# Patient Record
Sex: FEMALE | Race: WHITE
[De-identification: names, ages, dates, MRNs, and addresses within clinical notes are randomized per-mention and may not be internally consistent; named-entity substitution may affect disease eponyms.]

---

## 2017-04-27 ENCOUNTER — APPOINTMENT (OUTPATIENT)
Dept: OBGYN | Facility: CLINIC | Age: 22
End: 2017-04-27

## 2017-04-27 VITALS
BODY MASS INDEX: 27.28 KG/M2 | HEIGHT: 68 IN | WEIGHT: 180 LBS | SYSTOLIC BLOOD PRESSURE: 137 MMHG | DIASTOLIC BLOOD PRESSURE: 85 MMHG

## 2017-04-27 DIAGNOSIS — F17.200 NICOTINE DEPENDENCE, UNSPECIFIED, UNCOMPLICATED: ICD-10-CM

## 2017-05-02 LAB
C TRACH RRNA SPEC QL NAA+PROBE: NORMAL
CYTOLOGY CVX/VAG DOC THIN PREP: NORMAL
N GONORRHOEA RRNA SPEC QL NAA+PROBE: NORMAL
SOURCE TP AMPLIFICATION: NORMAL

## 2017-08-30 ENCOUNTER — RX RENEWAL (OUTPATIENT)
Age: 22
End: 2017-08-30

## 2017-10-28 ENCOUNTER — RX RENEWAL (OUTPATIENT)
Age: 22
End: 2017-10-28

## 2017-10-30 ENCOUNTER — RX RENEWAL (OUTPATIENT)
Age: 22
End: 2017-10-30

## 2018-05-07 ENCOUNTER — RX RENEWAL (OUTPATIENT)
Age: 23
End: 2018-05-07

## 2018-05-08 ENCOUNTER — RX RENEWAL (OUTPATIENT)
Age: 23
End: 2018-05-08

## 2018-05-24 ENCOUNTER — APPOINTMENT (OUTPATIENT)
Dept: OBGYN | Facility: CLINIC | Age: 23
End: 2018-05-24
Payer: COMMERCIAL

## 2018-05-24 VITALS
BODY MASS INDEX: 31.98 KG/M2 | SYSTOLIC BLOOD PRESSURE: 142 MMHG | WEIGHT: 211 LBS | DIASTOLIC BLOOD PRESSURE: 85 MMHG | HEIGHT: 68 IN

## 2018-05-24 VITALS — DIASTOLIC BLOOD PRESSURE: 75 MMHG | SYSTOLIC BLOOD PRESSURE: 113 MMHG

## 2018-05-24 PROCEDURE — 99395 PREV VISIT EST AGE 18-39: CPT

## 2018-05-28 LAB
C TRACH RRNA SPEC QL NAA+PROBE: NOT DETECTED
N GONORRHOEA RRNA SPEC QL NAA+PROBE: NOT DETECTED
SOURCE TP AMPLIFICATION: NORMAL

## 2018-05-31 LAB — CYTOLOGY CVX/VAG DOC THIN PREP: NORMAL

## 2018-07-09 ENCOUNTER — RX RENEWAL (OUTPATIENT)
Age: 23
End: 2018-07-09

## 2018-07-10 ENCOUNTER — RX RENEWAL (OUTPATIENT)
Age: 23
End: 2018-07-10

## 2018-07-25 ENCOUNTER — CLINICAL ADVICE (OUTPATIENT)
Age: 23
End: 2018-07-25

## 2019-07-15 ENCOUNTER — RX RENEWAL (OUTPATIENT)
Age: 24
End: 2019-07-15

## 2019-07-17 ENCOUNTER — MEDICATION RENEWAL (OUTPATIENT)
Age: 24
End: 2019-07-17

## 2019-09-03 ENCOUNTER — MEDICATION RENEWAL (OUTPATIENT)
Age: 24
End: 2019-09-03

## 2019-10-23 ENCOUNTER — APPOINTMENT (OUTPATIENT)
Dept: OBGYN | Facility: CLINIC | Age: 24
End: 2019-10-23
Payer: COMMERCIAL

## 2019-10-23 VITALS
DIASTOLIC BLOOD PRESSURE: 74 MMHG | SYSTOLIC BLOOD PRESSURE: 132 MMHG | BODY MASS INDEX: 28.49 KG/M2 | WEIGHT: 188 LBS | HEIGHT: 68 IN

## 2019-10-23 PROCEDURE — 99395 PREV VISIT EST AGE 18-39: CPT

## 2019-10-23 NOTE — HISTORY OF PRESENT ILLNESS
[1 Year Ago] : 1 year ago [Good] : being in good health [Healthy Diet] : a healthy diet [Regular Exercise] : regular exercise [Weight Concerns] : no concerns with her weight [Pap Smear Last Year] : Papanicolaou cytology last year [Menstrual Problems] : reports normal menses [Sexually Active] : is sexually active [Up to Date] : up to date with ~his/her~ STD screening

## 2019-10-29 LAB — CYTOLOGY CVX/VAG DOC THIN PREP: NORMAL

## 2019-11-30 DIAGNOSIS — A60.00 HERPESVIRAL INFECTION OF UROGENITAL SYSTEM, UNSPECIFIED: ICD-10-CM

## 2019-11-30 RX ORDER — VALACYCLOVIR 500 MG/1
500 TABLET, FILM COATED ORAL DAILY
Qty: 10 | Refills: 0 | Status: ACTIVE | COMMUNITY
Start: 2019-11-30 | End: 1900-01-01

## 2021-01-18 ENCOUNTER — RX RENEWAL (OUTPATIENT)
Age: 26
End: 2021-01-18

## 2021-05-06 ENCOUNTER — APPOINTMENT (OUTPATIENT)
Dept: OBGYN | Facility: CLINIC | Age: 26
End: 2021-05-06

## 2021-05-13 ENCOUNTER — APPOINTMENT (OUTPATIENT)
Dept: OBGYN | Facility: CLINIC | Age: 26
End: 2021-05-13
Payer: COMMERCIAL

## 2021-05-13 VITALS
DIASTOLIC BLOOD PRESSURE: 83 MMHG | HEIGHT: 68 IN | SYSTOLIC BLOOD PRESSURE: 135 MMHG | BODY MASS INDEX: 31.83 KG/M2 | WEIGHT: 210 LBS

## 2021-05-13 DIAGNOSIS — Z78.9 OTHER SPECIFIED HEALTH STATUS: ICD-10-CM

## 2021-05-13 DIAGNOSIS — Z01.419 ENCOUNTER FOR GYNECOLOGICAL EXAMINATION (GENERAL) (ROUTINE) W/OUT ABNORMAL FINDINGS: ICD-10-CM

## 2021-05-13 PROCEDURE — 99072 ADDL SUPL MATRL&STAF TM PHE: CPT

## 2021-05-13 PROCEDURE — 99395 PREV VISIT EST AGE 18-39: CPT

## 2021-05-13 RX ORDER — ETONOGESTREL AND ETHINYL ESTRADIOL .12; .015 MG/D; MG/D
0.12-0.015 INSERT, EXTENDED RELEASE VAGINAL
Qty: 1 | Refills: 0 | Status: COMPLETED | COMMUNITY
Start: 2017-04-27 | End: 2021-05-13

## 2021-05-13 RX ORDER — ETONOGESTREL AND ETHINYL ESTRADIOL 11.7; 2.7 MG/1; MG/1
0.12-0.015 INSERT, EXTENDED RELEASE VAGINAL
Qty: 3 | Refills: 1 | Status: COMPLETED | COMMUNITY
Start: 2018-06-12 | End: 2021-05-13

## 2021-05-13 RX ORDER — ETONOGESTREL AND ETHINYL ESTRADIOL .12; .015 MG/D; MG/D
0.12-0.015 INSERT, EXTENDED RELEASE VAGINAL
Qty: 1 | Refills: 1 | Status: COMPLETED | COMMUNITY
Start: 2018-05-08 | End: 2021-05-13

## 2021-05-13 RX ORDER — ETONOGESTREL AND ETHINYL ESTRADIOL .12; .015 MG/D; MG/D
0.12-0.015 INSERT, EXTENDED RELEASE VAGINAL
Qty: 90 | Refills: 3 | Status: COMPLETED | COMMUNITY
Start: 2018-05-24 | End: 2021-05-13

## 2021-05-14 LAB
CANDIDA VAG CYTO: NOT DETECTED
G VAGINALIS+PREV SP MTYP VAG QL MICRO: NOT DETECTED
T VAGINALIS VAG QL WET PREP: NOT DETECTED

## 2021-05-18 LAB — CYTOLOGY CVX/VAG DOC THIN PREP: NORMAL

## 2022-07-05 ENCOUNTER — RX RENEWAL (OUTPATIENT)
Age: 27
End: 2022-07-05

## 2022-07-12 ENCOUNTER — RX RENEWAL (OUTPATIENT)
Age: 27
End: 2022-07-12

## 2022-07-13 ENCOUNTER — RX RENEWAL (OUTPATIENT)
Age: 27
End: 2022-07-13

## 2022-08-18 ENCOUNTER — APPOINTMENT (OUTPATIENT)
Dept: OBGYN | Facility: CLINIC | Age: 27
End: 2022-08-18

## 2022-08-19 RX ORDER — ETONOGESTREL AND ETHINYL ESTRADIOL 11.7; 2.7 MG/1; MG/1
0.12-0.015 INSERT, EXTENDED RELEASE VAGINAL
Qty: 3 | Refills: 0 | Status: ACTIVE | COMMUNITY
Start: 2019-10-23 | End: 1900-01-01

## 2023-05-23 ENCOUNTER — FORM ENCOUNTER (OUTPATIENT)
Age: 28
End: 2023-05-23